# Patient Record
Sex: MALE | Race: OTHER | HISPANIC OR LATINO | URBAN - METROPOLITAN AREA
[De-identification: names, ages, dates, MRNs, and addresses within clinical notes are randomized per-mention and may not be internally consistent; named-entity substitution may affect disease eponyms.]

---

## 2018-03-09 ENCOUNTER — OUTPATIENT (OUTPATIENT)
Dept: OUTPATIENT SERVICES | Facility: HOSPITAL | Age: 68
LOS: 1 days | Discharge: HOME | End: 2018-03-09
Payer: MEDICARE

## 2018-03-09 VITALS — WEIGHT: 125 LBS | HEIGHT: 65 IN

## 2018-03-09 DIAGNOSIS — I42.8 OTHER CARDIOMYOPATHIES: ICD-10-CM

## 2018-03-09 DIAGNOSIS — I10 ESSENTIAL (PRIMARY) HYPERTENSION: ICD-10-CM

## 2018-03-09 DIAGNOSIS — Z95.0 PRESENCE OF CARDIAC PACEMAKER: Chronic | ICD-10-CM

## 2018-03-09 PROCEDURE — 71045 X-RAY EXAM CHEST 1 VIEW: CPT | Mod: 26

## 2018-03-09 RX ORDER — CEFAZOLIN SODIUM 1 G
1000 VIAL (EA) INJECTION EVERY 8 HOURS
Qty: 0 | Refills: 0 | Status: DISCONTINUED | OUTPATIENT
Start: 2018-03-09 | End: 2018-03-09

## 2018-03-09 RX ORDER — CEFAZOLIN SODIUM 1 G
1000 VIAL (EA) INJECTION ONCE
Qty: 0 | Refills: 0 | Status: COMPLETED | OUTPATIENT
Start: 2018-03-09 | End: 2018-03-09

## 2018-03-09 RX ORDER — METOPROLOL TARTRATE 50 MG
25 TABLET ORAL DAILY
Qty: 0 | Refills: 0 | Status: DISCONTINUED | OUTPATIENT
Start: 2018-03-09 | End: 2018-03-10

## 2018-03-09 RX ORDER — CEFAZOLIN SODIUM 1 G
VIAL (EA) INJECTION
Qty: 0 | Refills: 0 | Status: DISCONTINUED | OUTPATIENT
Start: 2018-03-09 | End: 2018-03-09

## 2018-03-09 RX ORDER — ACETAMINOPHEN 500 MG
650 TABLET ORAL ONCE
Qty: 0 | Refills: 0 | Status: COMPLETED | OUTPATIENT
Start: 2018-03-09 | End: 2018-03-09

## 2018-03-09 RX ORDER — METOPROLOL TARTRATE 50 MG
1 TABLET ORAL
Qty: 0 | Refills: 0 | COMMUNITY

## 2018-03-09 RX ORDER — ACETAMINOPHEN 500 MG
1 TABLET ORAL
Qty: 0 | Refills: 0 | COMMUNITY

## 2018-03-09 RX ORDER — CEFAZOLIN SODIUM 1 G
1000 VIAL (EA) INJECTION EVERY 8 HOURS
Qty: 0 | Refills: 0 | Status: COMPLETED | OUTPATIENT
Start: 2018-03-09 | End: 2018-03-10

## 2018-03-09 RX ADMIN — Medication 1000 MILLIGRAM(S): at 21:36

## 2018-03-09 RX ADMIN — Medication 100 MILLIGRAM(S): at 13:45

## 2018-03-09 RX ADMIN — Medication 650 MILLIGRAM(S): at 21:18

## 2018-03-09 RX ADMIN — Medication 650 MILLIGRAM(S): at 20:47

## 2018-03-09 NOTE — DISCHARGE NOTE ADULT - HOSPITAL COURSE
68 yo M with HTN, AV block, s/p PPM dependent, HLD, s/p upgrade to BiV ICD ([employer]) as his EF is 35% on echo in 2017 despite meds. He developed AFIB after procedure.  Given that he just s/p ICD upgrade, per Dr. Hernandes, no a/c for at least next 48 hours.  He will f/u with Dr. Hernandes in clinic this tuesday 3/13/18 and she will decide on A/C choice and if need dccv.

## 2018-03-09 NOTE — H&P ADULT - PROBLEM SELECTOR PLAN 1
Patient with dual chamber PPM, NICM, depressed EF 35%, presents for upgrade to BiV ICD.  Will continue Toprol and Eanlapril.

## 2018-03-09 NOTE — DISCHARGE NOTE ADULT - MEDICATION SUMMARY - MEDICATIONS TO TAKE
I will START or STAY ON the medications listed below when I get home from the hospital:    Tylenol 325 mg oral tablet  -- 1 tab(s) by mouth every 4 hours, As Needed  -- Indication: For Pain     enalapril 5 mg oral tablet  -- 1 tab(s) by mouth once a day  -- Indication: For HTN    pravastatin 20 mg oral tablet  -- 1 tab(s) by mouth once a day  -- Indication: For HLD    Metoprolol Succinate ER 25 mg oral tablet, extended release  -- 1 tab(s) by mouth once a day  -- Indication: For Atrial fibrillation

## 2018-03-09 NOTE — DISCHARGE NOTE ADULT - PATIENT PORTAL LINK FT
You can access the [facility] Patient Portal, offered by [facility], by registering with the following website: [email or web address]

## 2018-03-09 NOTE — H&P ADULT - PMH
AV block    Essential hypertension    History of pacemaker    Hyperlipidemia, unspecified hyperlipidemia type

## 2018-03-09 NOTE — DISCHARGE NOTE ADULT - CARE PLAN
Principal Discharge DX:	NICM (nonischemic cardiomyopathy)  Goal:	Follow up with Dr. Hernandes on Tuesday 3/13/18  Assessment and plan of treatment:	You underwent Implantable Cardioverter Defibrillation (ICD) from [employer] on 3/9/18.  Please follow up with Dr. Polanco [facility]     Avoid strenuous activities such as lifting / pushing and rising left arm above shoulder for 1 month.    Call Dr. Hernandes if you have question regarding the wound.  Call her if there is swelling / increasing pain or redness at the wound.   Do NOT peel off the steri strips on the wound. You can shower 4 days later.  Use the arm sling at night only (on the left arm).  Secondary Diagnosis:	Essential hypertension  Assessment and plan of treatment:	Continue home blood pressure medications  Low salt diet.  Secondary Diagnosis:	Atrial fibrillation  Assessment and plan of treatment:	You developed an irregular heart rhythm after the procedure. It is called Atrial fibrillation.    Please see Dr. Hernandes on Tuesday to discuss plan to manage it.  For now till you see her, she doesn't want to start you on a blood thinner because you just had a procedure and she doesn't want bleeding from the ICD site.

## 2018-03-09 NOTE — DISCHARGE NOTE ADULT - PLAN OF CARE
Follow up with Dr. Hernandes on Tuesday 3/13/18 You underwent Implantable Cardioverter Defibrillation (ICD) from [employer] on 3/9/18.  Please follow up with Dr. Polanco for wound check next Tues at [facility].     Avoid strenuous activities such as lifting / pushing and rising left arm above shoulder for 1 month.    Call Dr. Hernandes if you have question regarding the wound.  Call her if there is swelling / increasing pain or redness at the wound.   Do NOT peel off the steri strips on the wound. You can shower 4 days later.  Use the arm sling at night only (on the left arm). Continue home blood pressure medications  Low salt diet. You developed an irregular heart rhythm after the procedure. It is called Atrial fibrillation.    Please see Dr. Hernandes on Tuesday to discuss plan to manage it.  For now till you see her, she doesn't want to start you on a blood thinner because you just had a procedure and she doesn't want bleeding from the ICD site.

## 2018-03-09 NOTE — DISCHARGE NOTE ADULT - CARE PROVIDER_API CALL
Lindsay Hernandes (HUAN), Cardiac Electrophysiology; Cardiology  [address] [place] [address]  Phone: [phone]  Fax: [phone]

## 2018-03-09 NOTE — H&P ADULT - HISTORY OF PRESENT ILLNESS
66 yo M with history of HTN, AV block, s/p PPM dependent, HLD, presents for scheduled upgrade to BiV ICD.  Patient was noted to have depress EF in 2017, his ischemic work up was negative, he was placed on appropriate medical therapy , his follow up EF was 35 %. Patient was advised to have an up grade to BiV ICD.  Patient denies any chest pain, SOB, palpitations, dizziness, syncope. Able to ambulate about 20 city blocks.

## 2018-03-10 VITALS
HEART RATE: 104 BPM | DIASTOLIC BLOOD PRESSURE: 94 MMHG | RESPIRATION RATE: 16 BRPM | SYSTOLIC BLOOD PRESSURE: 123 MMHG | OXYGEN SATURATION: 96 %

## 2018-03-10 PROCEDURE — C1895: CPT

## 2018-03-10 PROCEDURE — C1882: CPT

## 2018-03-10 PROCEDURE — 33233 REMOVAL OF PM GENERATOR: CPT

## 2018-03-10 PROCEDURE — C1889: CPT

## 2018-03-10 PROCEDURE — 33249 INSJ/RPLCMT DEFIB W/LEAD(S): CPT

## 2018-03-10 PROCEDURE — 33225 L VENTRIC PACING LEAD ADD-ON: CPT

## 2018-03-10 PROCEDURE — 82962 GLUCOSE BLOOD TEST: CPT

## 2018-03-10 PROCEDURE — C1892: CPT

## 2018-03-10 PROCEDURE — C1887: CPT

## 2018-03-10 PROCEDURE — C1894: CPT

## 2018-03-10 PROCEDURE — 71045 X-RAY EXAM CHEST 1 VIEW: CPT

## 2018-03-10 RX ORDER — ACETAMINOPHEN 500 MG
650 TABLET ORAL ONCE
Qty: 0 | Refills: 0 | Status: COMPLETED | OUTPATIENT
Start: 2018-03-10 | End: 2018-03-10

## 2018-03-10 RX ADMIN — Medication 650 MILLIGRAM(S): at 00:19

## 2018-03-10 RX ADMIN — Medication 5 MILLIGRAM(S): at 06:29

## 2018-03-10 RX ADMIN — Medication 25 MILLIGRAM(S): at 07:43

## 2018-03-10 RX ADMIN — Medication 650 MILLIGRAM(S): at 01:07

## 2018-03-10 RX ADMIN — Medication 1000 MILLIGRAM(S): at 06:29
